# Patient Record
Sex: MALE | Race: WHITE | Employment: FULL TIME | URBAN - METROPOLITAN AREA
[De-identification: names, ages, dates, MRNs, and addresses within clinical notes are randomized per-mention and may not be internally consistent; named-entity substitution may affect disease eponyms.]

---

## 2024-03-11 ENCOUNTER — HOSPITAL ENCOUNTER (EMERGENCY)
Facility: HOSPITAL | Age: 19
Discharge: HOME | End: 2024-03-11

## 2024-03-11 VITALS
BODY MASS INDEX: 33.32 KG/M2 | TEMPERATURE: 98.1 F | RESPIRATION RATE: 18 BRPM | WEIGHT: 200 LBS | HEART RATE: 68 BPM | OXYGEN SATURATION: 99 % | SYSTOLIC BLOOD PRESSURE: 134 MMHG | HEIGHT: 65 IN | DIASTOLIC BLOOD PRESSURE: 70 MMHG

## 2024-03-11 DIAGNOSIS — S61.011A LACERATION OF RIGHT THUMB WITHOUT DAMAGE TO NAIL, FOREIGN BODY PRESENCE UNSPECIFIED, INITIAL ENCOUNTER: Primary | ICD-10-CM

## 2024-03-11 PROCEDURE — 99283 EMERGENCY DEPT VISIT LOW MDM: CPT | Mod: 25

## 2024-03-11 PROCEDURE — 12001 RPR S/N/AX/GEN/TRNK 2.5CM/<: CPT | Performed by: PHYSICIAN ASSISTANT

## 2024-03-11 ASSESSMENT — COLUMBIA-SUICIDE SEVERITY RATING SCALE - C-SSRS
6. HAVE YOU EVER DONE ANYTHING, STARTED TO DO ANYTHING, OR PREPARED TO DO ANYTHING TO END YOUR LIFE?: NO
1. IN THE PAST MONTH, HAVE YOU WISHED YOU WERE DEAD OR WISHED YOU COULD GO TO SLEEP AND NOT WAKE UP?: NO
2. HAVE YOU ACTUALLY HAD ANY THOUGHTS OF KILLING YOURSELF?: NO

## 2024-03-11 NOTE — ED TRIAGE NOTES
Pt presents to ED with c/o laceration to right thumb from can opener. - thinners, unknown last tetanus. Bleeding controlled in triage.

## 2024-03-12 NOTE — ED PROVIDER NOTES
EMERGENCY MEDICINE EVALUATION NOTE    History of Present Illness     Chief Complaint:   Chief Complaint   Patient presents with    Laceration       HPI: Moncho Luna is a 19 y.o. male presents with a chief complaint of lacerations to his right thumb.  Patient reports that he was opening a can of corn when his finger slipped and he lacerated the pad of his right thumb.  Patient was encouraged prior to arrival.  Patient is unsure of his last tetanus shot.  Patient denies any significant past medical history denies medication allergies.  Patient denies any loss of range of motion of the thumb.  Patient denies any loss of neurovascular status of the tip of the thumb.    Previous History   No past medical history on file.  No past surgical history on file.     No family history on file.  No Known Allergies  No current outpatient medications    Physical Exam     Appearance: Alert, oriented , cooperative,  in no acute distress.      Skin: 1.5 cm laceration on pad of right thumb.  Dry skin, no lesions, rash, petechiae or purpura.      Eyes: PERRLA, EOMs intact,  Conjunctiva pink      ENT: Hearing grossly intact. Pharynx clear, uvula midline.      Neck: Supple. Trachea at midline. No lymphadenopathy.     Pulmonary: Clear bilaterally. No rales, rhonchi or wheezing. No accessory muscle use or stridor.     Cardiac: Normal rate and rhythm without murmur     Abdomen: Soft, nontender, active bowel sounds.     Musculoskeletal: Full range of motion.  Full range of motion of thumb.  Patient able to flex and extend thumb at all joints even against pressure.  Patient able to oppose thumb to pinky.  Brisk cap refill and right thumb.     Neurological:Cranial nerves II through XII are grossly intact, normal sensation, no weakness, no focal findings identified.     Results   Labs Reviewed - No data to display  No orders to display         ED Course & Medical Decision Making     Medications   diphth,pertus(acell),tetanus (BoostRIX) 2.5-8-5  "Lf-mcg-Lf/0.5mL vaccine 0.5 mL (has no administration in time range)     Heart Rate:  [68]   Temperature:  [36.7 °C (98.1 °F)]   Respirations:  [18]   BP: (134)/(70)   Height:  [165.1 cm (5' 5\")]   Weight:  [90.7 kg (200 lb)]   Pulse Ox:  [99 %]    Diagnoses as of 03/11/24 2055   Laceration of right thumb without damage to nail, foreign body presence unspecified, initial encounter     19-year-old male presents today with chief complaint of a laceration to the pad of the right thumb.  Patient had laceration repaired in the emergency room today.  Small foreign body was removed however the wound was not grossly contaminated.  Patient was instructed to have sutures removed in 7 to 10 days return here immediately with any worsening signs or symptoms.  Patient in agreement with plan of care.    Procedures   Laceration Repair    Performed by: Cameron Gurrola PA-C  Authorized by: Cameron Gurrola PA-C    Consent:     Consent obtained:  Verbal    Consent given by:  Patient    Risks discussed:  Infection, pain and poor cosmetic result    Alternatives discussed:  No treatment  Laceration details:     Location:  Finger    Finger location:  R thumb    Length (cm):  1.5  Exploration:     Imaging outcome: foreign body noted      Wound extent: foreign bodies/material      Foreign bodies/material:  Small black speck removed from right thumb wound.  Treatment:     Area cleansed with:  Cally-Honorio    Amount of cleaning:  Standard    Irrigation solution:  Sterile saline    Irrigation method:  Pressure wash and syringe  Skin repair:     Repair method:  Sutures    Suture size:  4-0    Suture material:  Nylon    Suture technique:  Simple interrupted    Number of sutures:  3  Approximation:     Approximation:  Close  Repair type:     Repair type:  Simple  Post-procedure details:     Dressing:  Non-adherent dressing    Procedure completion:  Tolerated well, no immediate complications      Diagnosis     1. Laceration of right thumb without damage " to nail, foreign body presence unspecified, initial encounter        Disposition   Discharged    ED Prescriptions    None         Disclaimer: This note was dictated by speech recognition. Minor errors in transcription may be present. Please call if questions.       Cameron Gurrola PA-C  03/11/24 0539